# Patient Record
Sex: FEMALE
[De-identification: names, ages, dates, MRNs, and addresses within clinical notes are randomized per-mention and may not be internally consistent; named-entity substitution may affect disease eponyms.]

---

## 2018-11-10 ENCOUNTER — HOSPITAL ENCOUNTER (EMERGENCY)
Dept: HOSPITAL 42 - ED | Age: 23
Discharge: HOME | End: 2018-11-10
Payer: SELF-PAY

## 2018-11-10 VITALS
SYSTOLIC BLOOD PRESSURE: 127 MMHG | DIASTOLIC BLOOD PRESSURE: 72 MMHG | RESPIRATION RATE: 18 BRPM | OXYGEN SATURATION: 95 % | HEART RATE: 97 BPM

## 2018-11-10 VITALS — TEMPERATURE: 99.1 F

## 2018-11-10 DIAGNOSIS — J06.9: ICD-10-CM

## 2018-11-10 DIAGNOSIS — J45.901: Primary | ICD-10-CM

## 2018-11-10 DIAGNOSIS — F17.210: ICD-10-CM

## 2018-11-10 LAB
ALBUMIN SERPL-MCNC: 4.4 G/DL (ref 3–4.8)
ALBUMIN/GLOB SERPL: 1.2 {RATIO} (ref 1.1–1.8)
ALT SERPL-CCNC: 33 U/L (ref 7–56)
AST SERPL-CCNC: 31 U/L (ref 14–36)
BASOPHILS # BLD AUTO: 0.01 K/MM3 (ref 0–2)
BASOPHILS NFR BLD: 0.1 % (ref 0–3)
BUN SERPL-MCNC: 12 MG/DL (ref 7–21)
CALCIUM SERPL-MCNC: 9.2 MG/DL (ref 8.4–10.5)
EOSINOPHIL # BLD: 0.5 10*3/UL (ref 0–0.7)
EOSINOPHIL NFR BLD: 4.3 % (ref 1.5–5)
ERYTHROCYTE [DISTWIDTH] IN BLOOD BY AUTOMATED COUNT: 15.3 % (ref 11.5–14.5)
GFR NON-AFRICAN AMERICAN: > 60
GRANULOCYTES # BLD: 7.81 10*3/UL (ref 1.4–6.5)
GRANULOCYTES NFR BLD: 74.7 % (ref 50–68)
HGB BLD-MCNC: 13 G/DL (ref 12–16)
LYMPHOCYTES # BLD: 1.7 10*3/UL (ref 1.2–3.4)
LYMPHOCYTES NFR BLD AUTO: 16.4 % (ref 22–35)
MCH RBC QN AUTO: 27 PG (ref 25–35)
MCHC RBC AUTO-ENTMCNC: 33.3 G/DL (ref 31–37)
MCV RBC AUTO: 81.1 FL (ref 80–105)
MONOCYTES # BLD AUTO: 0.5 10*3/UL (ref 0.1–0.6)
MONOCYTES NFR BLD: 4.5 % (ref 1–6)
PLATELET # BLD: 213 10^3/UL (ref 120–450)
PMV BLD AUTO: 11.6 FL (ref 7–11)
RBC # BLD AUTO: 4.81 10^6/UL (ref 3.5–6.1)
WBC # BLD AUTO: 10.5 10^3/UL (ref 4.5–11)

## 2018-11-10 PROCEDURE — 99283 EMERGENCY DEPT VISIT LOW MDM: CPT

## 2018-11-10 PROCEDURE — 71045 X-RAY EXAM CHEST 1 VIEW: CPT

## 2018-11-10 PROCEDURE — 87804 INFLUENZA ASSAY W/OPTIC: CPT

## 2018-11-10 PROCEDURE — 96374 THER/PROPH/DIAG INJ IV PUSH: CPT

## 2018-11-10 PROCEDURE — 80053 COMPREHEN METABOLIC PANEL: CPT

## 2018-11-10 PROCEDURE — 85025 COMPLETE CBC W/AUTO DIFF WBC: CPT

## 2018-11-10 NOTE — ED PDOC
Arrival/HPI





- History of Present Illness


Narrative History of Present Illness (Text): 





11/10/18 21:13


23 year old female with a past medical history of asthma who comes in today 

complaining of shortness of breath for the past 2 days.  The patient reports 

feeling winded at rest and with activity.  Patient also admits to chills, non-

productive cough, and congestion. Patient reports taking Nebulizer, Robutussin, 

and leigh selzer without any improvement in symptoms. Patient denies any chest 

pain, dizziness, abdominal pain, syncopal episodes, or any other complaints.





Past medical history: asthma


Medications: Ventolin


Allergies: Squash


Surgical history: Tonsillectomy


Hospitalization history: denies








Time/Duration: < week


Symptom Onset: Gradual


Symptom Course: Unchanged


Quality: Other


Severity Level: 4


Activities at Onset: Rest


Context: Sitting, Walking





<Handy Busch - Last Filed: 11/10/18 22:53>





<Anibal Araujo - Last Filed: 11/11/18 03:50>





- General


Chief Complaint: Cough, Cold, Congestion





Past Medical History





- Provider Review


Nursing Documentation Reviewed: Yes





- Cardiac


Hx Cardiac Disorders: No





- Pulmonary


Hx Respiratory Disorders: Yes


Hx Asthma: Yes





- Neurological


Hx Neurological Disorder: No





- HEENT


Hx HEENT Disorder: Yes





- Renal


Hx Renal Disorder: No





- Endocrine/Metabolic


Hx Endocrine Disorders: No





- Hematological/Oncological


Hx Blood Disorders: No





- Integumentary


Hx Dermatological Disorder: No





- Musculoskeletal/Rheumatological


Hx Musculoskeletal Disorders: No





- Gastrointestinal


Hx Gastrointestinal Disorders: No





- Genitourinary/Gynecological


Hx Genitourinary Disorders: No





- Psychiatric


Hx Psychophysiologic Disorder: No


Hx Substance Use: No





- Surgical History


Hx Tonsillectomy: Yes





<Handy Busch - Last Filed: 11/10/18 22:53>





Family/Social History





- Physician Review


Nursing Documentation Reviewed: Yes


Family/Social History: No Known Family HX


Smoking Status: Current Some Days Smoker


Hx Alcohol Use: Yes


Frequency of alcohol use: Socially


Hx Substance Use: No





<Handy Busch - Last Filed: 11/10/18 22:53>





Allergies/Home Meds





<Handy Busch - Last Filed: 11/10/18 22:53>





<Anibal Araujo - Last Filed: 11/11/18 03:50>


Allergies/Adverse Reactions: 


Allergies





squash Allergy (Verified 11/10/18 20:51)


   SWELLING











Review of Systems





- Review of Systems


Constitutional: Normal.  absent: Fatigue, Weight Change, Fevers


Eyes: Normal.  absent: Vision Changes, Photophobia


ENT: Normal.  absent: Hearing Changes, Tinnitus, Epistaxis


Respiratory: SOB, Cough, Wheezing


Cardiovascular: Normal.  absent: Chest Pain, Syncope


Gastrointestinal: Normal.  absent: Abdominal Pain, Vomiting, Appetite Changes, 

Food Intolerance


Genitourinary Female: Normal.  absent: Dysuria, Frequency


Musculoskeletal: Normal.  absent: Arthralgias, Back Pain


Skin: Normal.  absent: Rash, Pruritis


Neurological: Normal.  absent: Headache, Dizziness, Focal Weakness


Endocrine: Normal.  absent: Diaphoresis, Polyuria, Polydipsia


Psychiatric: Normal.  absent: Anxiety, Depression





<Handy Busch - Last Filed: 11/10/18 22:53>





Physical Exam


Vital Signs Reviewed: Yes





Vital Signs











  Temp Pulse Resp BP Pulse Ox


 


 11/10/18 20:51  99.1 F  111 H  17  130/84  94 L











Temperature: Afebrile


Blood Pressure: Normal


Pulse: Tachycardic


Respiratory Rate: Normal


Appearance: Positive for: Well-Appearing, Non-Toxic, Comfortable


Pain Distress: None


Mental Status: Positive for: Alert and Oriented X 3.  No: Confused, Agitated





- Systems Exam


Head: Present: Atraumatic, Normocephalic


Pupils: Present: PERRL.  No: Sluggish


Extroacular Muscles: Present: EOMI.  No: Gaze Palsy


Conjunctiva: Present: Normal.  No: Injected


Mouth: Present: Moist Mucous Membranes.  No: Dry, Drooling


Neck: Present: Normal Range of Motion.  No: Meningeal Signs, JVD, 

Lymphadenopathy


Respiratory/Chest: Present: Wheezes, Rhonchi


Cardiovascular: Present: Normal S1, S2, Tachycardic


Abdomen: No: Tenderness, Distention, Normal Bowel Sounds, Guarding, Ostomy Tubes


Upper Extremity: Present: Normal Inspection.  No: Cyanosis, Edema


Lower Extremity: Present: Normal Inspection.  No: Edema, CALF TENDERNESS, 

Rosalind's Sign, Temperature Abnormalties


Neurological: Present: Speech Normal.  No: Normal Cerebellar Funct, Norm Deep 

Tendon Reflexes


Skin: Present: Dry, Normal Color.  No: Cold, Pale


Psychiatric: Present: Alert, Oriented x 3, Normal Insight





<Handy Busch - Last Filed: 11/10/18 22:53>





Vital Signs











  Temp Pulse Resp BP Pulse Ox


 


 11/10/18 20:51  99.1 F  111 H  17  130/84  94 L














<Anibal Araujo - Last Filed: 11/11/18 03:50>





Medical Decision Making


ED Course and Treatment: 





11/10/18 21:19


23 year old female with a past medical history of asthma who comes in today 

complaining of shortness of breath for the past 2 days





Plan:


CBC


CMP 


CXR


Rapid flu


Duonebs


IV Methylprednisone





11/10/18 22:43


Flu negative


CXR negative for active disease





Patient reports improvement in breathing. Re-examined and better. Stable for 

discharge.





- RAD Interpretation


Radiology Orders: 











11/10/18 21:12


CHEST PORTABLE [RAD] Stat 














- Medication Orders


Current Medication Orders: 











Methylprednisolone (Solu-Medrol)  125 mg IVP STAT STA


   Stop: 11/10/18 21:12





Discontinued Medications





Albuterol/Ipratropium (Duoneb 3 Mg/0.5 Mg (3 Ml) Ud)  3 ml IH STAT STA


   Stop: 11/10/18 21:10











<Handy Busch - Last Filed: 11/10/18 22:53>


ED Course and Treatment: 


Impression:


Pt seen and evaluated with medical resident. Aware and agree with plan. Pt, 

whose past medical history includes asthma, presented for shortness of breath, 

chills, non-productive cough, and congestion.





Plan:


-- Labs


-- Chest X-ray


-- Rapid influenza


-- Duoneb


-- Solu-medrol


-- Reassess and disposition





Progress Notes:








- Lab Interpretations


Lab Results: 











                                 11/10/18 21:30 





                                   Lab Results





11/10/18 21:30: WBC 10.5, RBC 4.81, Hgb 13.0, Hct 39.0, MCV 81.1, MCH 27.0, MCHC

 33.3, RDW 15.3 H, Plt Count 213, MPV 11.6 H, Gran % 74.7 H, Lymph % (Auto) 16.4

 L, Mono % (Auto) 4.5, Eos % (Auto) 4.3, Baso % (Auto) 0.1, Gran # 7.81 H, Lymph

 # (Auto) 1.7, Mono # (Auto) 0.5, Eos # (Auto) 0.5, Baso # (Auto) 0.01











- RAD Interpretation


Radiology Orders: 











11/10/18 21:12


CHEST PORTABLE [RAD] Stat 














- Medication Orders


Current Medication Orders: 














Discontinued Medications





Albuterol/Ipratropium (Duoneb 3 Mg/0.5 Mg (3 Ml) Ud)  3 ml IH STAT STA


   Stop: 11/10/18 21:10


   Last Admin: 11/10/18 21:15  Dose: 3 ml





Methylprednisolone (Solu-Medrol)  125 mg IVP STAT STA


   Stop: 11/10/18 21:12


   Last Admin: 11/10/18 21:15  Dose: 125 mg





IVP Administration


 Document     11/10/18 21:15  AD  (Rec: 11/10/18 21:32  AD  [id number])


     Charges for Administration


      # of IVP Administrations                   1














<Anibal Araujo - Last Filed: 11/11/18 03:50>





- PA / NP / Resident Statement


MD/DO has reviewed & agrees with the documentation as recorded.


MD/DO has examined the patient and agrees with the treatment plan.





- Scribe Statement


The provider has reviewed the documentation as recorded by the Pamela Lind





Provider Scribe Attestation:


All medical record entries made by the Jameeibgilbert were at my direction and 

personally dictated by me. I have reviewed the chart and agree that the record 

accurately reflects my personal performance of the history, physical exam, 

medical decision making, and the department course for this patient. I have also

 personally directed, reviewed, and agree with the discharge instructions and 

disposition.








<Anibal Araujo - Last Filed: 11/11/18 03:50>





Disposition/Present on Arrival





- Present on Arrival


Any Indicators Present on Arrival: No


History of DVT/PE: No


History of Uncontrolled Diabetes: No


Urinary Catheter: No


History of Decub. Ulcer: No


History Surgical Site Infection Following: None





- Disposition


Have Diagnosis and Disposition been Completed?: Yes


Disposition Time: 22:42


Patient Plan: Discharge





<Handy Busch - Last Filed: 11/10/18 22:53>





<Anibal Araujo - Last Filed: 11/11/18 03:50>





- Disposition


Diagnosis: 


 Upper respiratory infection, Asthma exacerbation, mild





Disposition: HOME/ ROUTINE


Condition: IMPROVED


Additional Instructions: 


1. F/u with PMD within 2 days of discharge.


2. Return to hospital for any new or worsening symptoms.


Prescriptions: 


RX: predniSONE [predniSONE Tab] 20 mg PO TID #15 tab


Azithromycin [Zithromax] 250 mg PO DAILY #4 tab


Forms:  [facility] (English), WORK NOTE

## 2018-11-11 NOTE — RAD
Date of service: 



11/10/2018



HISTORY:

 sob 



COMPARISON:

No prior. 



FINDINGS:



LUNGS:

The lungs are well inflated and clear.



PLEURA:

No pleural effusions or pneumothorax. 



CARDIOVASCULAR:

The heart is normal in size.  No aortic atherosclerotic calcification 

present. 



OSSEOUS STRUCTURES:

Within normal limits for the patient's age.



VISUALIZED UPPER ABDOMEN:

Normal.



OTHER FINDINGS:

None.



IMPRESSION:

No active pulmonary disease.

## 2019-02-01 ENCOUNTER — HOSPITAL ENCOUNTER (EMERGENCY)
Dept: HOSPITAL 42 - ED | Age: 24
LOS: 1 days | Discharge: HOME | End: 2019-02-02
Payer: SELF-PAY

## 2019-02-01 VITALS — RESPIRATION RATE: 18 BRPM

## 2019-02-01 DIAGNOSIS — Z83.3: ICD-10-CM

## 2019-02-01 DIAGNOSIS — R07.9: Primary | ICD-10-CM

## 2019-02-01 LAB
ALBUMIN SERPL-MCNC: 4.3 G/DL (ref 3–4.8)
ALBUMIN/GLOB SERPL: 1.2 {RATIO} (ref 1.1–1.8)
ALT SERPL-CCNC: 34 U/L (ref 7–56)
APPEARANCE UR: CLEAR
APTT BLD: 35.9 SECONDS (ref 26.9–38.3)
AST SERPL-CCNC: 40 U/L (ref 14–36)
BASOPHILS # BLD AUTO: 0 K/MM3 (ref 0–2)
BASOPHILS NFR BLD: 0 % (ref 0–3)
BILIRUB UR-MCNC: NEGATIVE MG/DL
BUN SERPL-MCNC: 17 MG/DL (ref 7–21)
CALCIUM SERPL-MCNC: 9.3 MG/DL (ref 8.4–10.5)
COLOR UR: YELLOW
EOSINOPHIL # BLD: 0.3 10*3/UL (ref 0–0.7)
EOSINOPHIL NFR BLD: 2.8 % (ref 1.5–5)
ERYTHROCYTE [DISTWIDTH] IN BLOOD BY AUTOMATED COUNT: 14.5 % (ref 11.5–14.5)
GFR NON-AFRICAN AMERICAN: > 60
GLUCOSE UR STRIP-MCNC: NEGATIVE MG/DL
HGB BLD-MCNC: 12.9 G/DL (ref 12–16)
INR PPP: 1.16
LEUKOCYTE ESTERASE UR-ACNC: NEGATIVE LEU/UL
LYMPHOCYTES # BLD: 3 10*3/UL (ref 1.2–3.4)
LYMPHOCYTES NFR BLD AUTO: 26.4 % (ref 22–35)
MCH RBC QN AUTO: 26.9 PG (ref 25–35)
MCHC RBC AUTO-ENTMCNC: 32.4 G/DL (ref 31–37)
MCV RBC AUTO: 83.1 FL (ref 80–105)
MONOCYTES # BLD AUTO: 0.3 10*3/UL (ref 0.1–0.6)
MONOCYTES NFR BLD: 2.8 % (ref 1–6)
PH UR STRIP: 6 [PH] (ref 4.7–8)
PLATELET # BLD: 228 10^3/UL (ref 120–450)
PMV BLD AUTO: 12.4 FL (ref 7–11)
PROT UR STRIP-MCNC: NEGATIVE MG/DL
PROTHROMBIN TIME: 12.9 SECONDS (ref 9.4–12.5)
RBC # BLD AUTO: 4.79 10^6/UL (ref 3.5–6.1)
RBC # UR STRIP: NEGATIVE /UL
SP GR UR STRIP: >= 1.03 (ref 1–1.03)
TROPONIN I SERPL-MCNC: < 0.01 NG/ML
UROBILINOGEN UR STRIP-ACNC: 0.2 E.U./DL
WBC # BLD AUTO: 11.4 10^3/UL (ref 4.5–11)

## 2019-02-01 NOTE — ED PDOC
Arrival/HPI





- General


Chief Complaint: Chest Pain


Time Seen by Provider: 02/01/19 18:20


Historian: Patient





- History of Present Illness


Narrative History of Present Illness (Text): 





02/01/19 18:55


A 23 year old female presents to the emergency department complaining of left 

upper chest pain radiating to left mid back since earlier today. Patient reports

pain started at work earlier today due to heavy lifting. Patient states symptoms

slightly resolved upon resting but state she is still in mild discomfort at this

time. Patient states she has a past family history of heart disease, diabetes, 

and cancer. Patient denies any fever, URI, palpitations, shortness of breath, 

headache, dizziness, diaphoresis, trauma, injury, or any other complaints. 





No PMD





Time/Duration: Other


Symptom Onset: Gradual


Symptom Course: Unchanged


Activities at Onset: Light


Context: Home





Past Medical History





- Provider Review


Nursing Documentation Reviewed: Yes





- Reproductive


Currently Pregnant: No





- Cardiac


Hx Cardiac Disorders: No





- Pulmonary


Hx Respiratory Disorders: Yes


Hx Asthma: Yes





- Neurological


Hx Neurological Disorder: No





- HEENT


Hx HEENT Disorder: Yes





- Renal


Hx Renal Disorder: No





- Endocrine/Metabolic


Hx Endocrine Disorders: No





- Hematological/Oncological


Hx Blood Disorders: No





- Integumentary


Hx Dermatological Disorder: No





- Musculoskeletal/Rheumatological


Hx Musculoskeletal Disorders: No





- Gastrointestinal


Hx Gastrointestinal Disorders: No





- Genitourinary/Gynecological


Hx Genitourinary Disorders: No





- Psychiatric


Hx Psychophysiologic Disorder: No


Hx Substance Use: No





- Surgical History


Hx Tonsillectomy: Yes





Family/Social History





- Physician Review


Nursing Documentation Reviewed: Yes


Family/Social History: No Known Family HX


Smoking Status: Current Some Days Smoker


Hx Alcohol Use: Yes


Frequency of alcohol use: Socially


Hx Substance Use: No





Allergies/Home Meds


Allergies/Adverse Reactions: 


Allergies





squash Allergy (Verified 02/01/19 18:08)


   SWELLING








Home Medications: 


                                    Home Meds











 Medication  Instructions  Recorded  Confirmed


 


Albuterol HFA [Ventolin HFA 90 2 puff NEB Q6 PRN 02/01/19 02/01/19





mcg/actuation (8 g)]   














Review of Systems





- Physician Review


All systems were reviewed & negative as marked: Yes





- Review of Systems


Constitutional: absent: Fevers


Respiratory: absent: SOB, Other (URI)


Cardiovascular: Chest Pain.  absent: Palpitations


Musculoskeletal: Back Pain


Neurological: absent: Headache, Dizziness


Endocrine: absent: Diaphoresis





Physical Exam


Vital Signs Reviewed: Yes





Vital Signs











  Temp Pulse Resp BP Pulse Ox


 


 02/01/19 17:58  97.9 F  75  18  125/71  98











Temperature: Afebrile


Blood Pressure: Normal


Pulse: Regular


Respiratory Rate: Normal


Appearance: Positive for: Well-Appearing


Mental Status: Positive for: Alert and Oriented X 3





- Systems Exam


Head: Present: Atraumatic, Normocephalic


Pupils: Present: PERRL


Extroacular Muscles: Present: EOMI


Conjunctiva: Present: Normal


Neck: Present: Normal Range of Motion


Respiratory/Chest: Present: Clear to Auscultation, Good Air Exchange.  No: 

Respiratory Distress, Accessory Muscle Use


Cardiovascular: Present: Regular Rate and Rhythm, Normal S1, S2.  No: Murmurs


Abdomen: No: Tenderness, Distention, Peritoneal Signs


Back: Present: Normal Inspection


Upper Extremity: Present: Normal Inspection.  No: Cyanosis, Edema


Lower Extremity: Present: Normal Inspection.  No: Edema


Neurological: Present: GCS=15, CN II-XII Intact, Speech Normal


Skin: Present: Warm, Dry, Normal Color.  No: Rashes


Psychiatric: Present: Alert, Oriented x 3, Normal Insight, Normal Concentration





Medical Decision Making


ED Course and Treatment: 





02/01/19 18:57


Impression: 23 year old presenting to the emergency room complaining of left 

side chest pain. 





Plan:


-- EKG


-- Labs


-- CBC


-- COAGs


-- Chest X-ray 


-- Reassess and disposition





Prior Visits:


Notes and results from previous visits were reviewed. 





Progress Notes:


-----------------------------------------

------------------------------------------------------------------------


Wells' Criteria for Pulmonary Embolism from [email or web address]  on 2/1/2019





RESULT SUMMARY:


0.0 points


Low risk group: 1.3% chance of PE in an ED population. 





Another study assigned scores = 4 as PE Unlikely and had a 3% incidence of PE.





INPUTS:


Clinical signs and symptoms of DVT > 0 = No


PE is #1 diagnosis OR equally likely > 0 = No


Heart rate > 100 > 0 = No


Immobilization at least 3 days OR surgery in the previous 4 weeks > 0 = No


Previous, objectively diagnosed PE or DVT > 0 = No


Hemoptysis > 0 = No


Malignancy w/ treatment within 6 months or palliative > 0 = No





 

--------------------------------------------------------------------------------


---------------------------------





02/01/19 19:22


EKG: Ordered, reviewed, and independently interpreted the EKG.


Rate : 83 BPM


Rhythm : NSR


Interpretation : No ST-segment elevations or depressions, no T-wave inversions, 

normal intervals.





CXR : NAD, as read by PA


Labs reviewed : trop (-), rest of the labs wnl. 





On reevaluation, patient reports improvement of symptoms, denies any back pain 

or SOB. On exam, patient remains awake alert and oriented 3 in no acute distr

ess. Results d/w the patient. Considering patient's onset of symptoms, and that 

she is a smoker with significant family history, will repeat a 2nd troponin 

after 3 hours. 2nd trop ordered. 








02/01/19 23:58


2nd trop (-). Repeat VS /58 P 82 O2sat 95%RA R 18.





On second reevaluation, patient denies any CP, SOB or back pain. On exam, 

patient laying in bed comfortably in no acute distress. Results d/w the patient.







Based on history, exam and diagnostic results plan will be for outpatient follow

up. 





Advised to follow up with the clinic in 1-2 days without fail. Return to the 

emergency room at any time for any new or worsening symptoms.





Patient states she fully agrees with and understands discharge instructions. 

States that she agrees with the plan and disposition. Verbalized and repeated 

discharge instructions and plan. I have given the patient opportunity to ask any

additional questions.











02/02/19 00:03








- Lab Interpretations


Lab Results: 





                                        











Urine Color  Yellow  (YELLOW)   02/01/19  19:00    


 


Urine Appearance  Clear  (CLEAR)   02/01/19  19:00    


 


Urine pH  6.0  (4.7-8.0)   02/01/19  19:00    


 


Ur Specific Gravity  >= 1.030  (1.005-1.035)   02/01/19  19:00    


 


Urine Protein  Negative mg/dL (<30 mg/dL)   02/01/19  19:00    


 


Urine Glucose (UA)  Negative mg/dL (NEGATIVE)   02/01/19  19:00    


 


Urine Ketones  Negative mg/dL (NEGATIVE)   02/01/19  19:00    


 


Urine Blood  Negative  (NEGATIVE)   02/01/19  19:00    


 


Urine Nitrate  Negative  (NEGATIVE)   02/01/19  19:00    


 


Urine Bilirubin  Negative  (NEGATIVE)   02/01/19  19:00    


 


Urine Urobilinogen  0.2 E.U./dL (<1 E.U./dL)   02/01/19  19:00    


 


Ur Leukocyte Esterase  Negative Amaury/uL (NEGATIVE)   02/01/19  19:00    














- RAD Interpretation


Radiology Orders: 











02/01/19 19:01


CHEST PORTABLE [RAD] Stat 














- PA / NP / Resident Statement


MD/DO has reviewed & agrees with the documentation as recorded.





- Scribe Statement


The provider has reviewed the documentation as recorded by the Scribgilbert Salamanca





All medical record entries made by the Scribe were at my direction and 

personally dictated by me. I have reviewed the chart and agree that the record 

accurately reflects my personal performance of the history, physical exam, 

medical decision making, and the department course for this patient. I have also

 personally directed, reviewed, and agree with the discharge instructions and 

disposition.








Disposition/Present on Arrival





- Present on Arrival


Any Indicators Present on Arrival: No


History of DVT/PE: No


History of Uncontrolled Diabetes: No


Urinary Catheter: No


History of Decub. Ulcer: No


History Surgical Site Infection Following: None





- Disposition


Have Diagnosis and Disposition been Completed?: Yes


Diagnosis: 


 Chest pain





Disposition: HOME/ ROUTINE


Disposition Time: 00:00


Patient Plan: Discharge


Condition: STABLE


Discharge Instructions (ExitCare):  Chest Pain (ED)


Additional Instructions: 


Thank you for letting us take care of you today. You were treated for chest 

pain. The emergency medical care you received today was directed at your acute s

ymptoms. Return to the Emergency Department if your symptoms worsen, do not 

improve, or if you have any other problems.





Please follow up with the clinic in 2 days for re-evaluation and follow up. Evelyn cristina any paperwork you were given at discharge with you along with any medications

 you are taking to your follow up visit. Our treatment cannot replace ongoing 

medical care by a primary care provider (PCP) outside of the emergency 

department.





Thank you for allowing the [facility] team to be part of your care today.








If you had an X-Ray : A Radiologist will review the ED reading if any change in 

treatment is needed we will contact you.***





Referrals: 


[facility] [Outside] - Follow up with primary


Forms:  [email or web address] (English), WORK NOTE

## 2019-02-02 VITALS
HEART RATE: 69 BPM | OXYGEN SATURATION: 98 % | SYSTOLIC BLOOD PRESSURE: 116 MMHG | DIASTOLIC BLOOD PRESSURE: 68 MMHG | TEMPERATURE: 98.2 F

## 2019-02-02 NOTE — CARD
--------------- APPROVED REPORT --------------





Date of service: 02/01/2019



EKG Measurement

Heart [id number]

VT [id number]

[id number]

[id number]

[id number]



<Conclusion>

Sinus rhythm with premature atrial complexes

Otherwise normal ECG

## 2019-02-02 NOTE — RAD
Date of service: 



02/01/2019



HISTORY:

 CP 



COMPARISON:

11/10/2018 



FINDINGS:



LUNGS:

No active pulmonary disease.



PLEURA:

No significant pleural effusion identified, no pneumothorax apparent.



CARDIOVASCULAR:

No aortic atherosclerotic calcification present.



Cardiomegaly..  No pulmonary vascular congestion. 



OSSEOUS STRUCTURES:

No significant abnormalities.



VISUALIZED UPPER ABDOMEN:

Normal.



OTHER FINDINGS:

None.



IMPRESSION:

No active disease.